# Patient Record
Sex: MALE | Race: WHITE | NOT HISPANIC OR LATINO | Employment: FULL TIME | URBAN - METROPOLITAN AREA
[De-identification: names, ages, dates, MRNs, and addresses within clinical notes are randomized per-mention and may not be internally consistent; named-entity substitution may affect disease eponyms.]

---

## 2017-05-21 ENCOUNTER — OFFICE VISIT (OUTPATIENT)
Dept: URGENT CARE | Facility: CLINIC | Age: 57
End: 2017-05-21
Payer: COMMERCIAL

## 2017-05-21 PROCEDURE — 99203 OFFICE O/P NEW LOW 30 MIN: CPT

## 2020-08-17 ENCOUNTER — HOSPITAL ENCOUNTER (EMERGENCY)
Facility: HOSPITAL | Age: 60
Discharge: HOME | End: 2020-08-17
Attending: EMERGENCY MEDICINE | Admitting: FAMILY MEDICINE
Payer: COMMERCIAL

## 2020-08-17 ENCOUNTER — APPOINTMENT (EMERGENCY)
Dept: CT IMAGING | Facility: HOSPITAL | Age: 60
End: 2020-08-17
Payer: COMMERCIAL

## 2020-08-17 ENCOUNTER — OFFICE VISIT (OUTPATIENT)
Dept: URGENT CARE | Facility: CLINIC | Age: 60
End: 2020-08-17
Payer: COMMERCIAL

## 2020-08-17 VITALS
WEIGHT: 248 LBS | DIASTOLIC BLOOD PRESSURE: 83 MMHG | SYSTOLIC BLOOD PRESSURE: 136 MMHG | BODY MASS INDEX: 38.92 KG/M2 | HEART RATE: 89 BPM | OXYGEN SATURATION: 97 % | TEMPERATURE: 97.3 F | RESPIRATION RATE: 18 BRPM | HEIGHT: 67 IN

## 2020-08-17 VITALS
WEIGHT: 248 LBS | TEMPERATURE: 97.7 F | SYSTOLIC BLOOD PRESSURE: 121 MMHG | BODY MASS INDEX: 38.84 KG/M2 | RESPIRATION RATE: 19 BRPM | HEART RATE: 74 BPM | OXYGEN SATURATION: 97 % | DIASTOLIC BLOOD PRESSURE: 77 MMHG

## 2020-08-17 DIAGNOSIS — A69.20 LYME DISEASE: ICD-10-CM

## 2020-08-17 DIAGNOSIS — M54.50 LOW BACK PAIN: Primary | ICD-10-CM

## 2020-08-17 DIAGNOSIS — R10.9 ACUTE RIGHT FLANK PAIN: Primary | ICD-10-CM

## 2020-08-17 DIAGNOSIS — M54.50 ACUTE RIGHT-SIDED LOW BACK PAIN WITHOUT SCIATICA: ICD-10-CM

## 2020-08-17 LAB
ALBUMIN SERPL BCP-MCNC: 4.3 G/DL (ref 3.5–5.7)
ALP SERPL-CCNC: 65 U/L (ref 40–150)
ALT SERPL W P-5'-P-CCNC: 39 U/L (ref 7–52)
ANION GAP SERPL CALCULATED.3IONS-SCNC: 8 MMOL/L (ref 4–13)
AST SERPL W P-5'-P-CCNC: 19 U/L (ref 13–39)
BACTERIA UR QL AUTO: ABNORMAL /HPF
BASOPHILS # BLD AUTO: 0.1 THOUSANDS/ΜL (ref 0–0.1)
BASOPHILS NFR BLD AUTO: 1 % (ref 0–2)
BILIRUB SERPL-MCNC: 0.5 MG/DL (ref 0.2–1)
BILIRUB UR QL STRIP: NEGATIVE
BUN SERPL-MCNC: 13 MG/DL (ref 7–25)
CALCIUM SERPL-MCNC: 9.4 MG/DL (ref 8.6–10.5)
CHLORIDE SERPL-SCNC: 106 MMOL/L (ref 98–107)
CLARITY UR: CLEAR
CO2 SERPL-SCNC: 25 MMOL/L (ref 21–31)
COLOR UR: YELLOW
CREAT SERPL-MCNC: 0.73 MG/DL (ref 0.7–1.3)
EOSINOPHIL # BLD AUTO: 0.6 THOUSAND/ΜL (ref 0–0.61)
EOSINOPHIL NFR BLD AUTO: 6 % (ref 0–5)
ERYTHROCYTE [DISTWIDTH] IN BLOOD BY AUTOMATED COUNT: 14.5 % (ref 11.5–14.5)
GFR SERPL CREATININE-BSD FRML MDRD: 102 ML/MIN/1.73SQ M
GLUCOSE SERPL-MCNC: 88 MG/DL (ref 65–99)
GLUCOSE UR STRIP-MCNC: NEGATIVE MG/DL
HCT VFR BLD AUTO: 44.6 % (ref 42–47)
HGB BLD-MCNC: 15.2 G/DL (ref 14–18)
HGB UR QL STRIP.AUTO: ABNORMAL
KETONES UR STRIP-MCNC: NEGATIVE MG/DL
LEUKOCYTE ESTERASE UR QL STRIP: NEGATIVE
LYMPHOCYTES # BLD AUTO: 1.5 THOUSANDS/ΜL (ref 0.6–4.47)
LYMPHOCYTES NFR BLD AUTO: 15 % (ref 21–51)
MCH RBC QN AUTO: 29.3 PG (ref 26–34)
MCHC RBC AUTO-ENTMCNC: 34.1 G/DL (ref 31–37)
MCV RBC AUTO: 86 FL (ref 81–99)
MONOCYTES # BLD AUTO: 0.7 THOUSAND/ΜL (ref 0.17–1.22)
MONOCYTES NFR BLD AUTO: 7 % (ref 2–12)
MUCOUS THREADS UR QL AUTO: ABNORMAL
NEUTROPHILS # BLD AUTO: 6.9 THOUSANDS/ΜL (ref 1.4–6.5)
NEUTS SEG NFR BLD AUTO: 71 % (ref 42–75)
NITRITE UR QL STRIP: NEGATIVE
NON-SQ EPI CELLS URNS QL MICRO: ABNORMAL /HPF
PH UR STRIP.AUTO: 5 [PH]
PLATELET # BLD AUTO: 248 THOUSANDS/UL (ref 149–390)
PMV BLD AUTO: 8.7 FL (ref 8.6–11.7)
POTASSIUM SERPL-SCNC: 4 MMOL/L (ref 3.5–5.5)
PROT SERPL-MCNC: 7.2 G/DL (ref 6.4–8.9)
PROT UR STRIP-MCNC: NEGATIVE MG/DL
RBC # BLD AUTO: 5.2 MILLION/UL (ref 4.3–5.9)
RBC #/AREA URNS AUTO: ABNORMAL /HPF
SL AMB  POCT GLUCOSE, UA: ABNORMAL
SL AMB LEUKOCYTE ESTERASE,UA: ABNORMAL
SL AMB POCT BILIRUBIN,UA: ABNORMAL
SL AMB POCT BLOOD,UA: ABNORMAL
SL AMB POCT CLARITY,UA: ABNORMAL
SL AMB POCT COLOR,UA: ABNORMAL
SL AMB POCT KETONES,UA: ABNORMAL
SL AMB POCT NITRITE,UA: ABNORMAL
SL AMB POCT PH,UA: 5
SL AMB POCT SPECIFIC GRAVITY,UA: 1.02
SL AMB POCT URINE PROTEIN: ABNORMAL
SL AMB POCT UROBILINOGEN: 0.2
SODIUM SERPL-SCNC: 139 MMOL/L (ref 134–143)
SP GR UR STRIP.AUTO: >=1.03 (ref 1–1.03)
UROBILINOGEN UR QL STRIP.AUTO: 0.2 E.U./DL
WBC # BLD AUTO: 9.8 THOUSAND/UL (ref 4.8–10.8)
WBC #/AREA URNS AUTO: ABNORMAL /HPF

## 2020-08-17 PROCEDURE — 99284 EMERGENCY DEPT VISIT MOD MDM: CPT

## 2020-08-17 PROCEDURE — 74177 CT ABD & PELVIS W/CONTRAST: CPT

## 2020-08-17 PROCEDURE — 96374 THER/PROPH/DIAG INJ IV PUSH: CPT

## 2020-08-17 PROCEDURE — 99213 OFFICE O/P EST LOW 20 MIN: CPT | Performed by: NURSE PRACTITIONER

## 2020-08-17 PROCEDURE — G1004 CDSM NDSC: HCPCS

## 2020-08-17 PROCEDURE — 85025 COMPLETE CBC W/AUTO DIFF WBC: CPT | Performed by: PHYSICIAN ASSISTANT

## 2020-08-17 PROCEDURE — 80053 COMPREHEN METABOLIC PANEL: CPT | Performed by: PHYSICIAN ASSISTANT

## 2020-08-17 PROCEDURE — 99284 EMERGENCY DEPT VISIT MOD MDM: CPT | Performed by: PHYSICIAN ASSISTANT

## 2020-08-17 PROCEDURE — 81001 URINALYSIS AUTO W/SCOPE: CPT | Performed by: PHYSICIAN ASSISTANT

## 2020-08-17 PROCEDURE — 81002 URINALYSIS NONAUTO W/O SCOPE: CPT | Performed by: NURSE PRACTITIONER

## 2020-08-17 PROCEDURE — 96361 HYDRATE IV INFUSION ADD-ON: CPT

## 2020-08-17 PROCEDURE — 86617 LYME DISEASE ANTIBODY: CPT | Performed by: PHYSICIAN ASSISTANT

## 2020-08-17 PROCEDURE — 36415 COLL VENOUS BLD VENIPUNCTURE: CPT | Performed by: PHYSICIAN ASSISTANT

## 2020-08-17 PROCEDURE — 86618 LYME DISEASE ANTIBODY: CPT | Performed by: PHYSICIAN ASSISTANT

## 2020-08-17 RX ORDER — CYCLOBENZAPRINE HCL 10 MG
10 TABLET ORAL 2 TIMES DAILY PRN
Qty: 20 TABLET | Refills: 0 | Status: SHIPPED | OUTPATIENT
Start: 2020-08-17

## 2020-08-17 RX ORDER — NAPROXEN 500 MG/1
500 TABLET ORAL 2 TIMES DAILY WITH MEALS
Qty: 30 TABLET | Refills: 0 | Status: SHIPPED | OUTPATIENT
Start: 2020-08-17

## 2020-08-17 RX ORDER — KETOROLAC TROMETHAMINE 30 MG/ML
15 INJECTION, SOLUTION INTRAMUSCULAR; INTRAVENOUS ONCE
Status: COMPLETED | OUTPATIENT
Start: 2020-08-17 | End: 2020-08-17

## 2020-08-17 RX ADMIN — IOHEXOL 100 ML: 350 INJECTION, SOLUTION INTRAVENOUS at 18:52

## 2020-08-17 RX ADMIN — KETOROLAC TROMETHAMINE 15 MG: 30 INJECTION, SOLUTION INTRAMUSCULAR at 18:21

## 2020-08-17 RX ADMIN — SODIUM CHLORIDE 1000 ML: 0.9 INJECTION, SOLUTION INTRAVENOUS at 18:21

## 2020-08-17 NOTE — PROGRESS NOTES
[facility]        NAME: Keith Ricketts is a 64 y o  male  : 1960    MRN: [id number]  DATE: 2020  TIME: 5:04 PM    Assessment and Plan   Acute right flank pain [R10 9]  1  Acute right flank pain  Transfer to other facility   2  Acute right-sided low back pain without sciatica  POCT urine dip         Patient Instructions     Patient Instructions   Proceed to the ER for further evaluation and treatment  Follow up with PCP in 3-5 days  Proceed to  ER if symptoms worsen  Chief Complaint     Chief Complaint   Patient presents with    Flank Pain     right flank pain for 2 days, analgesics not helping         History of Present Illness       Patient presents accompanied by wife  He reports right flank pain x2 days  No injury, no apparent trigger  He does acknowledge chronic lower back pain, and states that he is active and always doing something  He thought perhaps it was a pulled muscle, but states that this just feels very different  He did try icy hot at home as well as naproxen 440 mg without relief  States he feels tired today because he had trouble sleeping last night  He states the pain is constant but will suddenly become severe for a bit before leading up to the baseline constant pain  No change in urinary frequency  No urinary symptoms  Patient does note that he is slightly constipated and last had a bowel movement on Friday which was he and his wife state is atypical for him  Patient does note that he barely ate today though  Review of Systems   Review of Systems   Genitourinary: Positive for flank pain  Negative for decreased urine volume, dysuria, frequency, hematuria and urgency  Musculoskeletal: Positive for back pain  Negative for myalgias (states at baseline--does not feel like muscle pain)  All other systems reviewed and are negative  Current Medications     No current outpatient medications on file      Current Allergies     Allergies as of 08/17/2020    (No Known Allergies)            The following portions of the patient's history were reviewed and updated as appropriate: allergies, current medications, past family history, past medical history, past social history, past surgical history and problem list      History reviewed  No pertinent past medical history  History reviewed  No pertinent surgical history  History reviewed  No pertinent family history  Medications have been verified  Objective   /83   Pulse 89   Temp (!) 97 3 °F (36 3 °C) (Temporal)   Resp 18   Ht 5' 7" (1 702 m)   Wt 112 kg (248 lb)   SpO2 97%   BMI 38 84 kg/m²        Physical Exam     Physical Exam  Vitals signs and nursing note reviewed  Constitutional:       General: He is not in acute distress  Appearance: He is well-developed  He is not diaphoretic  HENT:      Head: Normocephalic and atraumatic  Neck:      Musculoskeletal: Normal range of motion and neck supple  Pulmonary:      Effort: Pulmonary effort is normal  No respiratory distress  Abdominal:      General: Bowel sounds are normal  There is no distension  Palpations: Abdomen is soft  Tenderness: There is no abdominal tenderness  There is right CVA tenderness  There is no left CVA tenderness, guarding or rebound  Musculoskeletal: Normal range of motion  Lumbar back: He exhibits normal range of motion, no tenderness, no bony tenderness, no swelling, no edema, no deformity, no laceration, no pain, no spasm and normal pulse  Comments: Lumbar back appears WNL  Very slight bilateral muscle tightness of both paraspinal muscles, but consistent with chronic back pain  Patient has absolutely no acute discomfort of left lower back but has significant pain of right flank with I barely touch it, even though the slight tightness of the sides feels comparable   Skin:     General: Skin is warm and dry        Capillary Refill: Capillary refill takes less than 2 seconds  Neurological:      Mental Status: He is alert and oriented to person, place, and time  Psychiatric:         Behavior: Behavior normal          Thought Content:  Thought content normal          Judgment: Judgment normal

## 2020-08-17 NOTE — Clinical Note
Yessy Dueñas was seen and treated in our emergency department on 8/17/2020  Diagnosis: Lumbar strain    Leopold Yun  may return to work on return date  He may return on 08/19/2020  Patient is excused from work on the 17th and 18th  If you have any questions or concerns, please don't hesitate to call        Martina Ann PA-C    ______________________________           _______________          _______________  Hospital Representative                              Date                                Time

## 2020-08-17 NOTE — ED PROVIDER NOTES
History  Chief Complaint   Patient presents with    Flank Pain     Patient presents to the department of emergency is today via private vehicle accompanied by his wife  He offers a chief complaint of right-sided low back pain  Sent here by the urgent care  He is alert orient x4 provides his own history stating pain began 2 days ago after completing some work in the [employer]  States the pain is in the right low back, it is constant with no intermittent episodes  Patient states it slightly radiates into the gluteal region  No hip pain  Denies abdominal pain nausea vomiting or diarrhea  He does however admit to some constipation which is a new finding for him  No black or red stool was noted however  No fever chills sweats  He states exacerbating factors are sitting in 1 position for too long as well as changing positions  He denies blood in the urine or any changes in urine stream   No fevers chills or sweats  He has self-treated with topical cream as well as Aleve with no relief  None       History reviewed  No pertinent past medical history  Past Surgical History:   Procedure Laterality Date    TONSILLECTOMY         History reviewed  No pertinent family history  I have reviewed and agree with the history as documented  E-Cigarette/Vaping    E-Cigarette Use Never User      E-Cigarette/Vaping Substances     Social History     Tobacco Use    Smoking status: Current Every Day Smoker     Packs/day: 1 00    Smokeless tobacco: Never Used   Substance Use Topics    Alcohol use: Yes    Drug use: Not on file       Review of Systems   Constitutional: Negative  Negative for activity change, appetite change, chills, diaphoresis, fatigue, fever and unexpected weight change  HENT: Negative  Negative for sore throat, trouble swallowing and voice change  Eyes: Negative  Respiratory: Negative  Negative for cough, chest tightness, shortness of breath and wheezing  Cardiovascular: Negative  Negative for chest pain, palpitations and leg swelling  Gastrointestinal: Negative  Negative for abdominal pain, blood in stool, nausea and vomiting  Endocrine: Negative  Genitourinary: Negative  Negative for flank pain and hematuria  Musculoskeletal: Positive for back pain  Negative for arthralgias, gait problem, joint swelling, myalgias, neck pain and neck stiffness  Skin: Negative  Negative for rash and wound  Allergic/Immunologic: Negative  Neurological: Negative  Negative for dizziness, seizures, syncope, weakness, light-headedness and headaches  Hematological: Negative  Psychiatric/Behavioral: Negative  All other systems reviewed and are negative  Physical Exam  Physical Exam  Vitals signs reviewed  Constitutional:       General: He is not in acute distress  Appearance: Normal appearance  He is normal weight  He is not ill-appearing, toxic-appearing or diaphoretic  HENT:      Head: Normocephalic  Nose: Nose normal    Eyes:      Pupils: Pupils are equal, round, and reactive to light  Neck:      Musculoskeletal: Normal range of motion  Cardiovascular:      Rate and Rhythm: Normal rate  Pulses: Normal pulses  Heart sounds: Normal heart sounds  Pulmonary:      Effort: Pulmonary effort is normal       Breath sounds: Normal breath sounds  Abdominal:      General: Abdomen is flat  There is no distension  Tenderness: There is no abdominal tenderness  Musculoskeletal:         General: Tenderness present  No swelling, deformity or signs of injury  Right lower leg: No edema  Left lower leg: No edema  Skin:     General: Skin is warm  Neurological:      General: No focal deficit present  Mental Status: He is alert     Psychiatric:         Mood and Affect: Mood normal          Vital Signs  ED Triage Vitals [08/17/20 1725]   Temperature Pulse Respirations Blood Pressure SpO2   97 7 °F (36 5 °C) 81 18 161/95 100 %      Temp Source Heart Rate Source Patient Position - Orthostatic VS BP Location FiO2 (%)   Temporal Monitor Sitting Left arm --      Pain Score       Worst Possible Pain           Vitals:    08/17/20 1725 08/17/20 1830   BP: 161/95 121/77   Pulse: 81 74   Patient Position - Orthostatic VS: Sitting Sitting         Visual Acuity      ED Medications  Medications   sodium chloride 0 9 % bolus 1,000 mL (1,000 mL Intravenous New Bag 8/17/20 1821)   ketorolac (TORADOL) injection 15 mg (15 mg Intravenous Given 8/17/20 1821)   iohexol (OMNIPAQUE) 350 MG/ML injection (SINGLE-DOSE) 100 mL (100 mL Intravenous Given 8/17/20 1852)       Diagnostic Studies  Results Reviewed     Procedure Component Value Units Date/Time    Lyme Antibody Profile with reflex to [facility] [[id number]] Collected:  08/17/20 1859    Lab Status:   In process Specimen:  Blood from Arm, Right Updated:  08/17/20 1904    Urine Microscopic [[id number]]  (Abnormal) Collected:  08/17/20 1740    Lab Status:  Final result Specimen:  Urine, Clean Catch Updated:  08/17/20 1855     RBC, UA 1-2 /hpf      WBC, UA 2-4 /hpf      Epithelial Cells Occasional /hpf      Bacteria, UA None Seen /hpf      MUCUS THREADS Moderate    Comprehensive metabolic panel [[id number]] Collected:  08/17/20 1748    Lab Status:  Final result Specimen:  Blood from Arm, Left Updated:  08/17/20 1815     Sodium 139 mmol/L      Potassium 4 0 mmol/L      Chloride 106 mmol/L      CO2 25 mmol/L      ANION GAP 8 mmol/L      BUN 13 mg/dL      Creatinine 0 73 mg/dL      Glucose 88 mg/dL      Calcium 9 4 mg/dL      AST 19 U/L      ALT 39 U/L      Alkaline Phosphatase 65 U/L      Total Protein 7 2 g/dL      Albumin 4 3 g/dL      Total Bilirubin 0 50 mg/dL      eGFR 102 ml/min/1 73sq m     Narrative:       [place] guidelines for Chronic Kidney Disease (CKD):     Stage 1 with normal or high GFR (GFR > 90 mL/min/1 73 square meters)    Stage 2 Mild CKD (GFR = 60-89 mL/min/1 73 square meters)    Stage 3A Moderate CKD (GFR = 45-59 mL/min/1 73 square meters)    Stage 3B Moderate CKD (GFR = 30-44 mL/min/1 73 square meters)    Stage 4 Severe CKD (GFR = 15-29 mL/min/1 73 square meters)    Stage 5 End Stage CKD (GFR <15 mL/min/1 73 square meters)  Note: GFR calculation is accurate only with a steady state creatinine    UA (URINE) with reflex to Scope [[id number]]  (Abnormal) Collected:  08/17/20 1740    Lab Status:  Final result Specimen:  Urine, Clean Catch Updated:  08/17/20 1805     Color, UA Yellow     Clarity, UA Clear     Specific Gravity, UA >=1 030     pH, UA 5 0     Leukocytes, UA Negative     Nitrite, UA Negative     Protein, UA Negative mg/dl      Glucose, UA Negative mg/dl      Ketones, UA Negative mg/dl      Urobilinogen, UA 0 2 E U /dl      Bilirubin, UA Negative     Blood, UA Trace-lysed    CBC and differential [[id number]]  (Abnormal) Collected:  08/17/20 1748    Lab Status:  Final result Specimen:  Blood from Arm, Left Updated:  08/17/20 1804     WBC 9 80 Thousand/uL      RBC 5 20 Million/uL      Hemoglobin 15 2 g/dL      Hematocrit 44 6 %      MCV 86 fL      MCH 29 3 pg      MCHC 34 1 g/dL      RDW 14 5 %      MPV 8 7 fL      Platelets 812 Thousands/uL      Neutrophils Relative 71 %      Lymphocytes Relative 15 %      Monocytes Relative 7 %      Eosinophils Relative 6 %      Basophils Relative 1 %      Neutrophils Absolute 6 90 Thousands/µL      Lymphocytes Absolute 1 50 Thousands/µL      Monocytes Absolute 0 70 Thousand/µL      Eosinophils Absolute 0 60 Thousand/µL      Basophils Absolute 0 10 Thousands/µL                  CT abdomen pelvis with contrast   Final Result by Yanci Robbins MD (08/17 1905)      Diffuse bladder wall thickening could relate to cystitis or underdistention, correlate with urinalysis  Colonic diverticulosis              Workstation performed: [id number]                    Procedures  Procedures         ED Course  ED Course as of Aug 17 1941   Mon Aug 17, 2020   4461 Sodium: [address] Glucose, Random: 88   1829 Calcium: 9 4   1829 Albumin: 4 3   1829 TOTAL BILIRUBIN: 0 50   1829 WBC: 9 80   1829 Monocytes Relative: 7   1829 Eosinophils(!): 6   1829 Basophils Absolute: 0 10   1829 Pt at [address] Patient called me in the room to re-evaluate him  He states he had an unknown tick/spider slacked she insect bite of the left thigh 4 weeks ago that stool has a minimal amount of redness around would like to be checked for Lyme  1923 IMPRESSION:     Diffuse bladder wall thickening could relate to cystitis or underdistention, correlate with urinalysis      Colonic diverticulosis  US AUDIT      Most Recent Value   Initial Alcohol Screen: US AUDIT-C    1  How often do you have a drink containing alcohol? 3 Filed at: 08/17/2020 1726   2  How many drinks containing alcohol do you have on a typical day you are drinking? 1 Filed at: 08/17/2020 1726   3a  Male UNDER 65: How often do you have five or more drinks on one occasion? 0 Filed at: 08/17/2020 1726   3b  FEMALE Any Age, or MALE 65+: How often do you have 4 or more drinks on one occassion? 0 Filed at: 08/17/2020 1726   Audit-C Score  4 Filed at: 08/17/2020 1726                  IVETT/DAST-10      Most Recent Value   How many times in the past year have you    Used an illegal drug or used a prescription medication for non-medical reasons? Never Filed at: 08/17/2020 1727                                MDM      Disposition  Final diagnoses:   Low back pain     Time reflects when diagnosis was documented in both MDM as applicable and the Disposition within this note     Time User Action Codes Description Comment    8/17/2020  7:24 PM Virgilio Boyle Add [M54 5] Low back pain       ED Disposition     ED Disposition Condition Date/Time Comment    Discharge Stable Mon Aug 17, 2020  7:24 PM Shaw Stern discharge to home/self care              Follow-up Information     Follow up With Specialties Details Why Contact Info    PCP  Schedule an appointment as soon as possible for a visit  As needed           Patient's Medications   Discharge Prescriptions    CYCLOBENZAPRINE (FLEXERIL) 10 MG TABLET    Take 1 tablet (10 mg total) by mouth 2 (two) times a day as needed for muscle spasms       Start Date: 8/17/2020 End Date: --       Order Dose: 10 mg       Quantity: 20 tablet    Refills: 0    NAPROXEN (NAPROSYN) 500 MG TABLET    Take 1 tablet (500 mg total) by mouth 2 (two) times a day with meals       Start Date: 8/17/2020 End Date: --       Order Dose: 500 mg       Quantity: 30 tablet    Refills: 0     No discharge procedures on file      PDMP Review     None          ED Provider  Electronically Signed by           Erasto Dodd PA-C  08/17/20 [place] [id number] Elena Farooq PA-C  08/17/20 3610

## 2020-08-20 LAB
B BURGDOR IGG PATRN SER IB-IMP: NEGATIVE
B BURGDOR IGG+IGM SER-ACNC: 1.46 ISR (ref 0–0.9)
B BURGDOR IGM PATRN SER IB-IMP: POSITIVE
B BURGDOR18KD IGG SER QL IB: ABNORMAL
B BURGDOR23KD IGG SER QL IB: ABNORMAL
B BURGDOR23KD IGM SER QL IB: PRESENT
B BURGDOR28KD IGG SER QL IB: ABNORMAL
B BURGDOR30KD IGG SER QL IB: ABNORMAL
B BURGDOR39KD IGG SER QL IB: ABNORMAL
B BURGDOR39KD IGM SER QL IB: PRESENT
B BURGDOR41KD IGG SER QL IB: ABNORMAL
B BURGDOR41KD IGM SER QL IB: PRESENT
B BURGDOR45KD IGG SER QL IB: ABNORMAL
B BURGDOR58KD IGG SER QL IB: PRESENT
B BURGDOR66KD IGG SER QL IB: ABNORMAL
B BURGDOR93KD IGG SER QL IB: ABNORMAL

## 2020-08-23 RX ORDER — DOXYCYCLINE HYCLATE 100 MG/1
100 CAPSULE ORAL 2 TIMES DAILY
Qty: 42 CAPSULE | Refills: 0 | Status: SHIPPED | OUTPATIENT
Start: 2020-08-23 | End: 2020-09-13

## 2021-04-30 ENCOUNTER — IMMUNIZATIONS (OUTPATIENT)
Dept: FAMILY MEDICINE CLINIC | Facility: HOSPITAL | Age: 61
End: 2021-04-30

## 2021-04-30 DIAGNOSIS — Z23 ENCOUNTER FOR IMMUNIZATION: Primary | ICD-10-CM

## 2021-04-30 PROCEDURE — 0001A SARS-COV-2 / COVID-19 MRNA VACCINE (PFIZER-BIONTECH) 30 MCG: CPT

## 2021-04-30 PROCEDURE — 91300 SARS-COV-2 / COVID-19 MRNA VACCINE (PFIZER-BIONTECH) 30 MCG: CPT

## 2021-05-21 ENCOUNTER — IMMUNIZATIONS (OUTPATIENT)
Dept: FAMILY MEDICINE CLINIC | Facility: HOSPITAL | Age: 61
End: 2021-05-21

## 2021-05-21 DIAGNOSIS — Z23 ENCOUNTER FOR IMMUNIZATION: Primary | ICD-10-CM

## 2021-05-21 PROCEDURE — 91300 SARS-COV-2 / COVID-19 MRNA VACCINE (PFIZER-BIONTECH) 30 MCG: CPT

## 2021-05-21 PROCEDURE — 0002A SARS-COV-2 / COVID-19 MRNA VACCINE (PFIZER-BIONTECH) 30 MCG: CPT
